# Patient Record
Sex: MALE | Race: BLACK OR AFRICAN AMERICAN | NOT HISPANIC OR LATINO | URBAN - METROPOLITAN AREA
[De-identification: names, ages, dates, MRNs, and addresses within clinical notes are randomized per-mention and may not be internally consistent; named-entity substitution may affect disease eponyms.]

---

## 2024-01-30 ENCOUNTER — OFFICE VISIT (OUTPATIENT)
Dept: URGENT CARE | Facility: CLINIC | Age: 24
End: 2024-01-30
Payer: COMMERCIAL

## 2024-01-30 VITALS
TEMPERATURE: 97.5 F | WEIGHT: 160 LBS | OXYGEN SATURATION: 96 % | SYSTOLIC BLOOD PRESSURE: 146 MMHG | HEART RATE: 65 BPM | DIASTOLIC BLOOD PRESSURE: 81 MMHG

## 2024-01-30 DIAGNOSIS — N34.2 URETHRITIS: Primary | ICD-10-CM

## 2024-01-30 PROCEDURE — 99204 OFFICE O/P NEW MOD 45 MIN: CPT | Performed by: PHYSICIAN ASSISTANT

## 2024-01-30 PROCEDURE — 87661 TRICHOMONAS VAGINALIS AMPLIF: CPT

## 2024-01-30 PROCEDURE — 87800 DETECT AGNT MULT DNA DIREC: CPT

## 2024-01-30 RX ORDER — DOXYCYCLINE 100 MG/1
100 CAPSULE ORAL 2 TIMES DAILY
Qty: 14 CAPSULE | Refills: 0 | Status: SHIPPED | OUTPATIENT
Start: 2024-01-30 | End: 2024-02-06

## 2024-01-30 ASSESSMENT — ENCOUNTER SYMPTOMS
PSYCHIATRIC NEGATIVE: 1
DYSURIA: 1
EYES NEGATIVE: 1
CONSTITUTIONAL NEGATIVE: 1
RESPIRATORY NEGATIVE: 1
NEUROLOGICAL NEGATIVE: 1
ABDOMINAL PAIN: 0
BACK PAIN: 0
HEMATOLOGIC/LYMPHATIC NEGATIVE: 1
CARDIOVASCULAR NEGATIVE: 1
ALLERGIC/IMMUNOLOGIC NEGATIVE: 1
ENDOCRINE NEGATIVE: 1

## 2024-01-30 ASSESSMENT — PAIN SCALES - GENERAL: PAINLEVEL: 6

## 2024-01-31 DIAGNOSIS — A54.9 GONORRHEA: Primary | ICD-10-CM

## 2024-01-31 LAB
C TRACH RRNA SPEC QL NAA+PROBE: NEGATIVE
N GONORRHOEA DNA SPEC QL PROBE+SIG AMP: POSITIVE
T VAGINALIS RRNA SPEC QL NAA+PROBE: NEGATIVE

## 2024-01-31 PROCEDURE — 96372 THER/PROPH/DIAG INJ SC/IM: CPT | Performed by: PHYSICIAN ASSISTANT

## 2024-01-31 RX ORDER — CEFTRIAXONE 500 MG/1
500 INJECTION, POWDER, FOR SOLUTION INTRAMUSCULAR; INTRAVENOUS ONCE
Status: COMPLETED | OUTPATIENT
Start: 2024-01-31 | End: 2024-01-31

## 2024-01-31 RX ADMIN — CEFTRIAXONE 500 MG: 500 INJECTION, POWDER, FOR SOLUTION INTRAMUSCULAR; INTRAVENOUS at 14:00

## 2024-01-31 NOTE — PROGRESS NOTES
Subjective   Patient ID: Russell Arechiga is a 23 y.o. male.      History provided by:  Patient   used: No    Difficulty Urinating  Presenting symptoms: dysuria and penile discharge    Associated symptoms: no abdominal pain      This is a 23 yr old male here for STD testing. States dysuria and yellow uretheral discharge x 1 day. Denies abdominal pain, back pain, genital rash or lesion.     Review of Systems   Constitutional: Negative.    HENT: Negative.     Eyes: Negative.    Respiratory: Negative.     Cardiovascular: Negative.    Gastrointestinal:  Negative for abdominal pain.   Endocrine: Negative.    Genitourinary:  Positive for dysuria and penile discharge. Negative for genital sores.   Musculoskeletal:  Negative for back pain.   Allergic/Immunologic: Negative.    Neurological: Negative.    Hematological: Negative.    Psychiatric/Behavioral: Negative.     All other systems reviewed and are negative.  /81   Pulse 65   Temp 36.4 °C (97.5 °F)   Wt 72.6 kg (160 lb)   SpO2 96%     Objective   Physical Exam  Vitals and nursing note reviewed.   Constitutional:       Appearance: Normal appearance.   HENT:      Head: Normocephalic and atraumatic.   Cardiovascular:      Rate and Rhythm: Normal rate and regular rhythm.   Pulmonary:      Effort: Pulmonary effort is normal.      Breath sounds: Normal breath sounds.   Abdominal:      Palpations: Abdomen is soft.      Tenderness: There is no abdominal tenderness. There is no right CVA tenderness or left CVA tenderness.   Genitourinary:     Comments: Exam deferred  Skin:     General: Skin is warm and dry.   Neurological:      General: No focal deficit present.      Mental Status: He is alert and oriented to person, place, and time.   Psychiatric:         Mood and Affect: Mood normal.         Behavior: Behavior normal.     Assessment:  Urethritis    Plan:  Urine for GC, chlamydia and trich sent and pending  Doxycycline 100 mg bid x 1 week  Pt refused  rocephin injection. I advised him if gonorrhea test is positive then he will have to come back  Follow up with pcp if not improving or worsening  ER visit anytime 24/7 for acute worsening or changing condition

## 2024-01-31 NOTE — PATIENT INSTRUCTIONS
We will call if any other culture results are positive requiring more medication  Use condoms when sexually active  Pcp follow up this week if not improving or worsening  ER visit anytime 24/7 for acute worsening or changing condition

## 2024-02-20 ENCOUNTER — OFFICE VISIT (OUTPATIENT)
Dept: URGENT CARE | Facility: CLINIC | Age: 24
End: 2024-02-20
Payer: COMMERCIAL

## 2024-02-20 VITALS
HEIGHT: 75 IN | SYSTOLIC BLOOD PRESSURE: 122 MMHG | OXYGEN SATURATION: 98 % | WEIGHT: 165 LBS | BODY MASS INDEX: 20.51 KG/M2 | DIASTOLIC BLOOD PRESSURE: 78 MMHG | HEART RATE: 63 BPM | TEMPERATURE: 98 F | RESPIRATION RATE: 16 BRPM

## 2024-02-20 DIAGNOSIS — N34.2 URETHRITIS: Primary | ICD-10-CM

## 2024-02-20 LAB
POC APPEARANCE, URINE: ABNORMAL
POC BILIRUBIN, URINE: NEGATIVE
POC BLOOD, URINE: NEGATIVE
POC COLOR, URINE: YELLOW
POC GLUCOSE, URINE: NEGATIVE MG/DL
POC KETONES, URINE: NEGATIVE MG/DL
POC LEUKOCYTES, URINE: ABNORMAL
POC NITRITE,URINE: NEGATIVE
POC PH, URINE: 6.5 PH
POC PROTEIN, URINE: NEGATIVE MG/DL
POC SPECIFIC GRAVITY, URINE: >=1.03
POC UROBILINOGEN, URINE: 0.2 EU/DL

## 2024-02-20 PROCEDURE — 99214 OFFICE O/P EST MOD 30 MIN: CPT | Performed by: PHYSICIAN ASSISTANT

## 2024-02-20 PROCEDURE — 81002 URINALYSIS NONAUTO W/O SCOPE: CPT | Performed by: PHYSICIAN ASSISTANT

## 2024-02-20 PROCEDURE — 87800 DETECT AGNT MULT DNA DIREC: CPT

## 2024-02-20 ASSESSMENT — ENCOUNTER SYMPTOMS
ALLERGIC/IMMUNOLOGIC NEGATIVE: 1
ENDOCRINE NEGATIVE: 1
EYES NEGATIVE: 1
PSYCHIATRIC NEGATIVE: 1
CARDIOVASCULAR NEGATIVE: 1
NEUROLOGICAL NEGATIVE: 1
CONSTITUTIONAL NEGATIVE: 1
ABDOMINAL PAIN: 0
RESPIRATORY NEGATIVE: 1
BACK PAIN: 0
HEMATOLOGIC/LYMPHATIC NEGATIVE: 1
DYSURIA: 0

## 2024-02-20 ASSESSMENT — PAIN SCALES - GENERAL: PAINLEVEL: 0-NO PAIN

## 2024-02-20 NOTE — PATIENT INSTRUCTIONS
We will call if you need any treatment  Pcp follow up if not improving or worsening  ER visit anytime 24/7 for acute worsening or changing condition

## 2024-02-20 NOTE — PROGRESS NOTES
"Subjective   Patient ID: Russell Arechiga is a 23 y.o. male.      History provided by:  Patient   used: No    Penile Discharge  Associated symptoms: no abdominal pain    This is a 23 yr old male here for yellow penile discharge x 1 days. Denies genital rash or lesions, dysuria, back pain, abdominal pain, or testicle pain or swelling. Had gonorrhea a few weeks ago and received IM rocephin.     Review of Systems   Constitutional: Negative.    HENT: Negative.     Eyes: Negative.    Respiratory: Negative.     Cardiovascular: Negative.    Gastrointestinal:  Negative for abdominal pain.   Endocrine: Negative.    Genitourinary:  Positive for penile discharge. Negative for dysuria, genital sores, scrotal swelling and testicular pain.   Musculoskeletal:  Negative for back pain.   Skin: Negative.    Allergic/Immunologic: Negative.    Neurological: Negative.    Hematological: Negative.    Psychiatric/Behavioral: Negative.     All other systems reviewed and are negative.  /78   Pulse 63   Temp 36.7 °C (98 °F)   Resp 16   Ht 1.905 m (6' 3\")   Wt 74.8 kg (165 lb)   SpO2 98%   BMI 20.62 kg/m²     Objective   Physical Exam  Vitals and nursing note reviewed.   Constitutional:       Appearance: Normal appearance.   HENT:      Head: Normocephalic and atraumatic.   Cardiovascular:      Rate and Rhythm: Normal rate and regular rhythm.   Pulmonary:      Effort: Pulmonary effort is normal.      Breath sounds: Normal breath sounds.   Abdominal:      Palpations: Abdomen is soft.      Tenderness: There is no abdominal tenderness. There is no right CVA tenderness or left CVA tenderness.   Skin:     General: Skin is warm and dry.   Neurological:      General: No focal deficit present.      Mental Status: He is alert and oriented to person, place, and time.   Psychiatric:         Mood and Affect: Mood normal.         Behavior: Behavior normal.     UA dip-trace leuks, no blood or " nitrites    Assessment:  Urethritis    Plan;  Urine for GC and chlamydia sent and pending  Pt declined tx till results available  Pcp follow up this week if not improving or worsening  ER visit anytime 24/7 for acute worsening or changing condition

## 2024-02-21 LAB
C TRACH RRNA SPEC QL NAA+PROBE: NEGATIVE
N GONORRHOEA DNA SPEC QL PROBE+SIG AMP: NEGATIVE

## 2024-03-27 ENCOUNTER — OFFICE VISIT (OUTPATIENT)
Dept: URGENT CARE | Facility: CLINIC | Age: 24
End: 2024-03-27
Payer: COMMERCIAL

## 2024-03-27 VITALS
TEMPERATURE: 98.2 F | WEIGHT: 150 LBS | SYSTOLIC BLOOD PRESSURE: 118 MMHG | HEIGHT: 72 IN | BODY MASS INDEX: 20.32 KG/M2 | RESPIRATION RATE: 14 BRPM | DIASTOLIC BLOOD PRESSURE: 68 MMHG | HEART RATE: 54 BPM | OXYGEN SATURATION: 97 %

## 2024-03-27 DIAGNOSIS — N34.2 URETHRITIS: Primary | ICD-10-CM

## 2024-03-27 PROCEDURE — 87800 DETECT AGNT MULT DNA DIREC: CPT

## 2024-03-27 PROCEDURE — 1036F TOBACCO NON-USER: CPT | Performed by: PHYSICIAN ASSISTANT

## 2024-03-27 PROCEDURE — 87661 TRICHOMONAS VAGINALIS AMPLIF: CPT

## 2024-03-27 PROCEDURE — 99214 OFFICE O/P EST MOD 30 MIN: CPT | Performed by: PHYSICIAN ASSISTANT

## 2024-03-27 ASSESSMENT — PAIN SCALES - GENERAL: PAINLEVEL: 0-NO PAIN

## 2024-03-27 NOTE — LETTER
March 27, 2024     Patient: Russell Arechiga   YOB: 2000   Date of Visit: 3/27/2024       To Whom It May Concern:    It is my medical opinion that Russell Arechiga may return to work on 03/28/2024 .    If you have any questions or concerns, please don't hesitate to call.         Sincerely,        Barron Easley PA-C    CC: No Recipients

## 2024-03-27 NOTE — PROGRESS NOTES
Subjective   Patient ID: Russell Arechiga is a 24 y.o. male who presents for Penile Discharge (X2 days. No dysuria. Concern for STI).  Patient notes he has not been sexually active since January he did test positive for gonorrhea in January he was treated appropriately with Rocephin and had repeat testing February 20 that was negative at that time.  Patient denies any sexual activity since that time but the urethral discharge has returned.  He notes yellow discharge.  He denies any dysuria.  Denies any tenderness into the scrotum or the testicles.  Denies any fevers or chills rashes or lesions of the inguinal region      The remainder of the systems were reviewed and are negative unless noted above      Objective   /68   Pulse 54   Temp 36.8 °C (98.2 °F) (Temporal)   Resp 14   Ht 1.829 m (6')   Wt 68 kg (150 lb)   SpO2 97%   BMI 20.34 kg/m²   Physical Exam  Constitutional:       General: He is not in acute distress.     Appearance: Normal appearance. He is not ill-appearing, toxic-appearing or diaphoretic.   HENT:      Head: Normocephalic and atraumatic.      Mouth/Throat:      Mouth: Mucous membranes are moist.      Pharynx: Oropharynx is clear.   Eyes:      Conjunctiva/sclera: Conjunctivae normal.   Cardiovascular:      Rate and Rhythm: Normal rate and regular rhythm.      Heart sounds: No murmur heard.  Pulmonary:      Effort: Pulmonary effort is normal. No respiratory distress.      Breath sounds: Normal breath sounds. No wheezing.   Genitourinary:     Comments: Patient declines urogenital examination today  Musculoskeletal:         General: No swelling, tenderness, deformity or signs of injury. Normal range of motion.      Cervical back: Normal range of motion and neck supple.   Skin:     General: Skin is warm and dry.      Findings: No rash.   Neurological:      General: No focal deficit present.      Mental Status: He is alert and oriented to person, place, and time.      Gait: Gait normal.          Assessment/Plan   Problem List Items Addressed This Visit       Urethritis - Primary    Relevant Orders    C. trachomatis + N. gonorrhoeae, Amplified    Trichomonas vaginalis, Nucleic Acid Detection    Mycoplasma / Ureaplasma Culture    Syphilis Screen with Reflex    HIV 1/2 Antigen/Antibody Screen with Reflex to Confirmation   Patient is here with symptoms of yellow urethral discharge.  At the end of January patient had tested positive for gonorrhea.  Notes that he has not had sex with anybody since that time.  He notes that symptoms did seem to improve but he was retested again February 20 all test were negative at that time.  Symptoms persistent since.  Patient denies any pain into the scrotum or the testicles denies any rashes in the inguinal region.  He denies any fevers or chills or abdominal pain    Given the persistent discharge I am sending testing for gonorrhea chlamydia and trichomonas as well as mycoplasma Ureaplasma culture.  I have placed orders for the patient to have blood drawn at the lab for syphilis and HIV.  Discussed with patient we do not draw blood labs here at this clinic

## 2024-03-27 NOTE — PATIENT INSTRUCTIONS
Assessment/Plan   Problem List Items Addressed This Visit       Urethritis - Primary    Relevant Orders    C. trachomatis + N. gonorrhoeae, Amplified    Trichomonas vaginalis, Nucleic Acid Detection    Mycoplasma / Ureaplasma Culture    Syphilis Screen with Reflex    HIV 1/2 Antigen/Antibody Screen with Reflex to Confirmation   Patient is here with symptoms of yellow urethral discharge.  At the end of January patient had tested positive for gonorrhea.  Notes that he has not had sex with anybody since that time.  He notes that symptoms did seem to improve but he was retested again February 20 all test were negative at that time.  Symptoms persistent since.  Patient denies any pain into the scrotum or the testicles denies any rashes in the inguinal region.  He denies any fevers or chills or abdominal pain    Given the persistent discharge I am sending testing for gonorrhea chlamydia and trichomonas as well as mycoplasma Ureaplasma culture.  I have placed orders for the patient to have blood drawn at the lab for syphilis and HIV.  Discussed with patient we do not draw blood labs here at this clinic

## 2024-03-28 LAB
C TRACH RRNA SPEC QL NAA+PROBE: NEGATIVE
N GONORRHOEA DNA SPEC QL PROBE+SIG AMP: NEGATIVE
T VAGINALIS RRNA SPEC QL NAA+PROBE: NEGATIVE

## 2024-03-29 ENCOUNTER — LAB (OUTPATIENT)
Dept: LAB | Facility: LAB | Age: 24
End: 2024-03-29
Payer: COMMERCIAL

## 2024-03-29 LAB
HIV 1+2 AB+HIV1 P24 AG SERPL QL IA: NONREACTIVE
TREPONEMA PALLIDUM IGG+IGM AB [PRESENCE] IN SERUM OR PLASMA BY IMMUNOASSAY: NONREACTIVE

## 2024-03-29 PROCEDURE — 36415 COLL VENOUS BLD VENIPUNCTURE: CPT

## 2024-03-29 PROCEDURE — 86780 TREPONEMA PALLIDUM: CPT

## 2024-03-29 PROCEDURE — 87389 HIV-1 AG W/HIV-1&-2 AB AG IA: CPT

## 2024-04-05 ENCOUNTER — OFFICE VISIT (OUTPATIENT)
Dept: URGENT CARE | Facility: CLINIC | Age: 24
End: 2024-04-05
Payer: COMMERCIAL

## 2024-04-05 VITALS
HEART RATE: 57 BPM | WEIGHT: 155 LBS | SYSTOLIC BLOOD PRESSURE: 126 MMHG | BODY MASS INDEX: 19.27 KG/M2 | DIASTOLIC BLOOD PRESSURE: 71 MMHG | TEMPERATURE: 97.7 F | RESPIRATION RATE: 16 BRPM | OXYGEN SATURATION: 98 % | HEIGHT: 75 IN

## 2024-04-05 DIAGNOSIS — R36.9 URETHRAL DISCHARGE IN MALE: Primary | ICD-10-CM

## 2024-04-05 LAB
POC APPEARANCE, URINE: ABNORMAL
POC BILIRUBIN, URINE: NEGATIVE
POC BLOOD, URINE: NEGATIVE
POC COLOR, URINE: YELLOW
POC GLUCOSE, URINE: NEGATIVE MG/DL
POC KETONES, URINE: NEGATIVE MG/DL
POC LEUKOCYTES, URINE: NEGATIVE
POC NITRITE,URINE: NEGATIVE
POC PH, URINE: 7 PH
POC PROTEIN, URINE: NEGATIVE MG/DL
POC SPECIFIC GRAVITY, URINE: 1.02
POC UROBILINOGEN, URINE: 0.2 EU/DL

## 2024-04-05 PROCEDURE — 81002 URINALYSIS NONAUTO W/O SCOPE: CPT | Performed by: PHYSICIAN ASSISTANT

## 2024-04-05 PROCEDURE — 99213 OFFICE O/P EST LOW 20 MIN: CPT | Performed by: PHYSICIAN ASSISTANT

## 2024-04-05 RX ORDER — DOXYCYCLINE 100 MG/1
100 CAPSULE ORAL 2 TIMES DAILY
Qty: 20 CAPSULE | Refills: 0 | Status: SHIPPED | OUTPATIENT
Start: 2024-04-05 | End: 2024-04-17 | Stop reason: ALTCHOICE

## 2024-04-05 ASSESSMENT — PAIN SCALES - GENERAL: PAINLEVEL: 0-NO PAIN

## 2024-04-05 ASSESSMENT — ENCOUNTER SYMPTOMS: DYSURIA: 0

## 2024-04-05 NOTE — PROGRESS NOTES
Subjective   Patient ID: Russell Arechiga is a 24 y.o. male.    Patient is a 24-year-old male who complains of ongoing urethral discharge that he states he has been experiencing for the past 2 months.  Patient was seen and evaluated this urgent care facility in January for same at which time he was given Rocephin 250 mg IM and provided with a prescription for doxycycline.  Patient states that he did not know that he was prescribed doxycycline and did not take that medication.  Patient was again seen at this urgent care facility on 27 March 2024 at which time repeat STD testing was performed in addition to HIV and syphilis screens.  All results are in the [facility] medical records and are all noted to be negative.  Patient denies fever, chills or other illness symptoms.  Patient reports no rash or lesions to the skin of his genitalia.  Patient has been in the [place] area for the past 4 months but states he is from [place] and has no local primary care physician here.      The following portions of the chart were reviewed this encounter and updated as appropriate:       Review of Systems   Genitourinary:  Positive for penile discharge. Negative for dysuria, scrotal swelling and testicular pain.   Skin:  Negative for rash.   All other systems reviewed and are negative.  Objective   Physical Exam  Vitals and nursing note reviewed.   Constitutional:       Appearance: Normal appearance. He is normal weight.   Cardiovascular:      Pulses: Normal pulses.   Pulmonary:      Effort: Pulmonary effort is normal.      Breath sounds: Normal breath sounds.   Skin:     General: Skin is warm and dry.      Capillary Refill: Capillary refill takes less than 2 seconds.   Neurological:      General: No focal deficit present.      Mental Status: He is alert and oriented to person, place, and time.   Psychiatric:         Mood and Affect: Mood normal.         Behavior: Behavior normal.         Thought Content: Thought content normal.          Judgment: Judgment normal.     Assessment/Plan   Limited physical exam findings as noted above.  Patient strenuously objected to physical exam of his genitalia despite the explanation that a culture of the urethra should be obtained given his recent negative laboratory test results.  Patient was provided with a prescription for doxycycline 100 mg for presumed urethritis and instructed to make certain that he obtains that medication and take as directed.  Patient was advised that no additional testing or evaluation at all is possible at this urgent care facility and he needs to be evaluated by a primary care physician or urologist.  Patient also refuses referral to the above.  Patient states that he anticipates returning to [place] next week and he was instructed to obtain follow-up medical care in [place].    CLINICAL IMPRESSION:  Reported Urethral Discharge      Diagnoses and all orders for this visit:  Urethral discharge in male  -     POCT UA (nonautomated w/o microscopy) manually resulted  -     C. Trachomatis / N. Gonorrhoeae, Amplified Detection  -     Trichomonas vaginalis, Nucleic Acid Detection  -     doxycycline (Monodox) 100 mg capsule; Take 1 capsule (100 mg) by mouth 2 times a day for 10 days. Take with at least 8 ounces (large glass) of water, do not lie down for 30 minutes after    Patient disposition: Home

## 2024-04-05 NOTE — LETTER
April 5, 2024     Patient: Russell Arechiga   YOB: 2000   Date of Visit: 4/5/2024       To Whom It May Concern:    It is my medical opinion that Russell Arechiga may return to work on 06 April 2024 .    If you have any questions or concerns, please don't hesitate to call.         Sincerely,        Mauricio Mcknight PA-C    CC: No Recipients

## 2024-04-17 ENCOUNTER — OFFICE VISIT (OUTPATIENT)
Dept: URGENT CARE | Facility: CLINIC | Age: 24
End: 2024-04-17
Payer: COMMERCIAL

## 2024-04-17 VITALS
HEIGHT: 75 IN | RESPIRATION RATE: 14 BRPM | DIASTOLIC BLOOD PRESSURE: 79 MMHG | BODY MASS INDEX: 19.27 KG/M2 | WEIGHT: 155 LBS | SYSTOLIC BLOOD PRESSURE: 126 MMHG | HEART RATE: 64 BPM | TEMPERATURE: 98.3 F | OXYGEN SATURATION: 98 %

## 2024-04-17 DIAGNOSIS — L73.1 INGROWN HAIR: Primary | ICD-10-CM

## 2024-04-17 PROBLEM — L73.9 FOLLICULITIS: Status: ACTIVE | Noted: 2024-04-17

## 2024-04-17 PROCEDURE — 99213 OFFICE O/P EST LOW 20 MIN: CPT

## 2024-04-17 PROCEDURE — 1036F TOBACCO NON-USER: CPT

## 2024-04-17 ASSESSMENT — ENCOUNTER SYMPTOMS
FEVER: 0
CHILLS: 0
VOMITING: 0
DIARRHEA: 0
NAUSEA: 0

## 2024-04-17 ASSESSMENT — PAIN SCALES - GENERAL: PAINLEVEL: 0-NO PAIN

## 2024-04-17 NOTE — PROGRESS NOTES
Subjective   Patient ID: Russell Arechiga is a 24 y.o. male.    HPI  Patient presents to urgent care for complaints of a lesion on his penis for the last week.  Patient denies any pain, itching, discharge from the area.  Patient states that he is in a sexual relationship with his girlfriend, however she has no history of cold sores, or vaginal lesions.  Patient states that he has no history of the herpes virus.  Patient states that he was seen here at the beginning of the month for possible STI however those symptoms have resolved and once he stopped taking the antibiotic he noticed this.  Patient denies any fevers, chills.      Review of Systems   Constitutional:  Negative for chills and fever.   Gastrointestinal:  Negative for diarrhea, nausea and vomiting.   Genitourinary:  Negative for penile discharge, penile pain, penile swelling, scrotal swelling and testicular pain.   Skin:  Positive for rash.     Objective   Physical Exam  Constitutional:       Appearance: Normal appearance.   Eyes:      Extraocular Movements: Extraocular movements intact.      Conjunctiva/sclera: Conjunctivae normal.      Pupils: Pupils are equal, round, and reactive to light.   Cardiovascular:      Rate and Rhythm: Normal rate and regular rhythm.      Pulses: Normal pulses.      Heart sounds: Normal heart sounds.   Pulmonary:      Effort: Pulmonary effort is normal.      Breath sounds: Normal breath sounds.   Genitourinary:     Penis: Normal.    Skin:     General: Skin is warm and dry.      Capillary Refill: Capillary refill takes less than 2 seconds.      Comments: Ingrown hair noted on shaft of penis   Neurological:      General: No focal deficit present.      Mental Status: He is alert and oriented to person, place, and time.         Discussed with patient that this is an ingrown hair and will resolve on its own.  I do not see any concern for possible HSV so no further testing was done at this time.  Follow-up with primary care provider  if no improvement of symptoms.    Assessment/Plan   Problem List Items Addressed This Visit             ICD-10-CM    Ingrown hair - Primary L73.1       Patient disposition: Home

## 2024-04-17 NOTE — LETTER
April 17, 2024     Patient: Russell Arechiga   YOB: 2000   Date of Visit: 4/17/2024       To Whom It May Concern:    It is my medical opinion that Russell Arechiga may return to work on 4/18/24 .    If you have any questions or concerns, please don't hesitate to call.         Sincerely,        PAM Fair-CNP    CC: No Recipients

## 2024-04-22 ENCOUNTER — OFFICE VISIT (OUTPATIENT)
Dept: URGENT CARE | Facility: CLINIC | Age: 24
End: 2024-04-22
Payer: COMMERCIAL

## 2024-04-22 VITALS
RESPIRATION RATE: 14 BRPM | WEIGHT: 155 LBS | OXYGEN SATURATION: 98 % | DIASTOLIC BLOOD PRESSURE: 71 MMHG | SYSTOLIC BLOOD PRESSURE: 114 MMHG | BODY MASS INDEX: 19.27 KG/M2 | TEMPERATURE: 98.2 F | HEART RATE: 68 BPM | HEIGHT: 75 IN

## 2024-04-22 DIAGNOSIS — K52.9 GASTROENTERITIS: Primary | ICD-10-CM

## 2024-04-22 PROCEDURE — 99213 OFFICE O/P EST LOW 20 MIN: CPT | Performed by: PHYSICIAN ASSISTANT

## 2024-04-22 PROCEDURE — 1036F TOBACCO NON-USER: CPT | Performed by: PHYSICIAN ASSISTANT

## 2024-04-22 ASSESSMENT — PAIN SCALES - GENERAL: PAINLEVEL: 0-NO PAIN

## 2024-04-22 NOTE — PATIENT INSTRUCTIONS
Assessment/Plan   Problem List Items Addressed This Visit       Gastroenteritis - Primary      Patient history and symptoms consistent with likely viral or self-limiting gastroenteritis versus foodborne illness  Symptoms improved patient is here for work note next scheduled date of work is Wednesday patient notes that he is feeling well enough to return to work

## 2024-04-22 NOTE — PROGRESS NOTES
"Subjective   Patient ID: Russell Arechiga is a 24 y.o. male who presents for Diarrhea (Multiple episodes of diarrhea x2 days after \"eating chinese food\". No N/V.) and Headache (Currently resolved.).  Patient notes that he had several episodes of nonbloody diarrhea.  Notes symptoms now resolved and stools are back to normal.  Notes headache also resolved.  No episodes of vomiting.  No fevers.  Patient notes that he had to call off work and is here for a work note      The remainder of the systems were reviewed and are negative unless noted above      Objective   /71   Pulse 68   Temp 36.8 °C (98.2 °F) (Temporal)   Resp 14   Ht 1.905 m (6' 3\")   Wt 70.3 kg (155 lb)   SpO2 98%   BMI 19.37 kg/m²   Physical Exam  Constitutional:       General: He is not in acute distress.     Appearance: Normal appearance. He is not ill-appearing, toxic-appearing or diaphoretic.   HENT:      Head: Normocephalic and atraumatic.      Mouth/Throat:      Mouth: Mucous membranes are moist.      Pharynx: Oropharynx is clear.   Eyes:      Conjunctiva/sclera: Conjunctivae normal.   Cardiovascular:      Rate and Rhythm: Normal rate and regular rhythm.      Heart sounds: No murmur heard.  Pulmonary:      Effort: Pulmonary effort is normal. No respiratory distress.      Breath sounds: Normal breath sounds. No wheezing.   Abdominal:      General: Abdomen is flat. Bowel sounds are normal.      Palpations: Abdomen is soft.      Tenderness: There is no abdominal tenderness. There is no guarding or rebound.   Musculoskeletal:         General: No swelling, tenderness, deformity or signs of injury. Normal range of motion.      Cervical back: Normal range of motion and neck supple.   Skin:     General: Skin is warm and dry.      Findings: No erythema or rash.   Neurological:      General: No focal deficit present.      Mental Status: He is alert and oriented to person, place, and time.      Gait: Gait normal.         Assessment/Plan   Problem " List Items Addressed This Visit       Gastroenteritis - Primary      Patient history and symptoms consistent with likely viral or self-limiting gastroenteritis versus foodborne illness.  Symptoms now completely resolved; patient is here for work note next scheduled date of work is Wednesday patient notes that he is feeling well enough to return to work

## 2024-04-22 NOTE — LETTER
April 22, 2024     Patient: Russell Arechiga   YOB: 2000   Date of Visit: 4/22/2024       To Whom It May Concern:    It is my medical opinion that Rusesll Arechiga may return to work on 04/24/2024 .    If you have any questions or concerns, please don't hesitate to call.         Sincerely,        Barron Easley PA-C    CC: No Recipients

## 2024-05-09 ENCOUNTER — OFFICE VISIT (OUTPATIENT)
Dept: URGENT CARE | Facility: CLINIC | Age: 24
End: 2024-05-09
Payer: COMMERCIAL

## 2024-05-09 VITALS
DIASTOLIC BLOOD PRESSURE: 78 MMHG | SYSTOLIC BLOOD PRESSURE: 122 MMHG | OXYGEN SATURATION: 96 % | BODY MASS INDEX: 19.37 KG/M2 | RESPIRATION RATE: 18 BRPM | HEART RATE: 60 BPM | TEMPERATURE: 98.2 F | WEIGHT: 155 LBS

## 2024-05-09 DIAGNOSIS — K52.9 GASTROENTERITIS: Primary | ICD-10-CM

## 2024-05-09 PROCEDURE — 99213 OFFICE O/P EST LOW 20 MIN: CPT

## 2024-05-09 ASSESSMENT — ENCOUNTER SYMPTOMS
DIARRHEA: 1
FATIGUE: 0
COUGH: 0
FEVER: 0
WEAKNESS: 0
RHINORRHEA: 0
NAUSEA: 1
ABDOMINAL PAIN: 0
CHILLS: 0
HEADACHES: 0
CHEST TIGHTNESS: 0
SORE THROAT: 0
MYALGIAS: 0
SHORTNESS OF BREATH: 0
BLOOD IN STOOL: 0
VOMITING: 1

## 2024-05-09 ASSESSMENT — PAIN SCALES - GENERAL: PAINLEVEL: 0-NO PAIN

## 2024-05-09 NOTE — PROGRESS NOTES
Subjective   Patient ID: Russell Arechiga is a 24 y.o. male.    HPI    Patient presents to urgent care for work note. He states that Monday he ate [employer] and then Monday night into Tuesday he developed both vomiting and diarrhea. Patient states that he is feeling completely back to normal. Patient denies any nausea, vomiting, or diarrhea now. Patient denies any known fevers, chills, or abdominal pain. Patient denies any bloody or bilious vomiting or diarrhea.     Review of Systems   Constitutional:  Negative for chills, fatigue and fever.   HENT:  Negative for congestion, ear pain, rhinorrhea and sore throat.    Respiratory:  Negative for cough, chest tightness and shortness of breath.    Gastrointestinal:  Positive for diarrhea, nausea and vomiting. Negative for abdominal pain and blood in stool.   Musculoskeletal:  Negative for myalgias.   Neurological:  Negative for weakness and headaches.     Objective   Physical Exam  Constitutional:       Appearance: Normal appearance. He is normal weight.   HENT:      Head: Normocephalic and atraumatic.      Right Ear: Tympanic membrane, ear canal and external ear normal.      Left Ear: Tympanic membrane, ear canal and external ear normal.      Nose: Nose normal.      Mouth/Throat:      Mouth: Mucous membranes are moist.      Pharynx: Oropharynx is clear.   Eyes:      Extraocular Movements: Extraocular movements intact.      Conjunctiva/sclera: Conjunctivae normal.      Pupils: Pupils are equal, round, and reactive to light.   Cardiovascular:      Rate and Rhythm: Normal rate and regular rhythm.      Pulses: Normal pulses.      Heart sounds: Normal heart sounds.   Pulmonary:      Effort: Pulmonary effort is normal.      Breath sounds: Normal breath sounds.   Abdominal:      General: Abdomen is flat. Bowel sounds are normal.      Palpations: Abdomen is soft.   Musculoskeletal:         General: Normal range of motion.   Skin:     General: Skin is warm and dry.      Capillary  Refill: Capillary refill takes less than 2 seconds.   Neurological:      General: No focal deficit present.      Mental Status: He is alert and oriented to person, place, and time.       Work note provided for patient to return to work on 5/10/24 or next scheduled shift.     Assessment/Plan   Problem List Items Addressed This Visit             ICD-10-CM    Gastroenteritis - Primary K52.9       Patient disposition: Home

## 2024-05-09 NOTE — LETTER
May 9, 2024     Patient: Russell Arechiga   YOB: 2000   Date of Visit: 5/9/2024       To Whom It May Concern:    It is my medical opinion that Russell Arechiga may return to work on 5/10/24 or next scheduled shift .    If you have any questions or concerns, please don't hesitate to call.         Sincerely,        Sheeba Darnell, PAM-CNP    CC: No Recipients

## 2024-05-17 ENCOUNTER — OFFICE VISIT (OUTPATIENT)
Dept: URGENT CARE | Facility: CLINIC | Age: 24
End: 2024-05-17
Payer: COMMERCIAL

## 2024-05-17 VITALS
TEMPERATURE: 98 F | OXYGEN SATURATION: 96 % | WEIGHT: 150 LBS | DIASTOLIC BLOOD PRESSURE: 62 MMHG | RESPIRATION RATE: 16 BRPM | HEIGHT: 75 IN | SYSTOLIC BLOOD PRESSURE: 110 MMHG | BODY MASS INDEX: 18.65 KG/M2 | HEART RATE: 56 BPM

## 2024-05-17 DIAGNOSIS — L73.1 INGROWN HAIR: Primary | ICD-10-CM

## 2024-05-17 PROCEDURE — 99213 OFFICE O/P EST LOW 20 MIN: CPT | Performed by: PHYSICIAN ASSISTANT

## 2024-05-17 ASSESSMENT — PAIN SCALES - GENERAL: PAINLEVEL: 8

## 2024-05-17 NOTE — LETTER
May 17, 2024     Patient: Russell Arechiga   YOB: 2000   Date of Visit: 5/17/2024       To Whom It May Concern:    It is my medical opinion that Russell Arechiga may return to work on 05/18/2024 .    If you have any questions or concerns, please don't hesitate to call.         Sincerely,        Barron Easley PA-C    CC: No Recipients

## 2024-05-17 NOTE — PATIENT INSTRUCTIONS
Assessment/Plan   Problem List Items Addressed This Visit       Ingrown hair - Primary      Examination without any significant erythema or edema.  I do not suspect localized infection.  Recommending patient may return to work without restrictions

## 2024-05-17 NOTE — PROGRESS NOTES
"Subjective   Patient ID: Russell Arechiga is a 24 y.o. male who presents for Groin Pain (Ingrown hair / drainage today painful /1month with lump).  Patient denies any continued pain at the area after it reportedly drained today.  He denies any fevers or chills.  Notes that he called off work because of the discomfort.  He is here today for a work note      The remainder of the systems were reviewed and are negative unless noted above      Objective   /62 (BP Location: Left arm, Patient Position: Sitting, BP Cuff Size: Adult)   Pulse 56   Temp 36.7 °C (98 °F) (Temporal)   Resp 16   Ht 1.905 m (6' 3\")   Wt 68 kg (150 lb)   SpO2 96%   BMI 18.75 kg/m²   Physical Exam  Constitutional:       General: He is not in acute distress.     Appearance: Normal appearance. He is not ill-appearing, toxic-appearing or diaphoretic.   HENT:      Head: Normocephalic and atraumatic.      Mouth/Throat:      Mouth: Mucous membranes are moist.      Pharynx: Oropharynx is clear.   Eyes:      Conjunctiva/sclera: Conjunctivae normal.   Cardiovascular:      Rate and Rhythm: Normal rate and regular rhythm.      Heart sounds: No murmur heard.  Pulmonary:      Effort: Pulmonary effort is normal. No respiratory distress.      Breath sounds: Normal breath sounds. No wheezing.   Genitourinary:     Penis: Normal.       Comments:   Examination of the penis shows no evidence of erythema or edema surrounding the hair follicle in question.  In fact I do not see any evidence of ingrown hair at place of the patient is pointing to in particular at all  Musculoskeletal:         General: No swelling, tenderness, deformity or signs of injury. Normal range of motion.      Cervical back: Normal range of motion and neck supple.   Skin:     General: Skin is warm and dry.      Findings: No erythema or rash.   Neurological:      General: No focal deficit present.      Mental Status: He is alert.      Gait: Gait normal.         Assessment/Plan   Problem " List Items Addressed This Visit       Ingrown hair - Primary      Examination without any significant erythema or edema.  I do not suspect localized infection.  Recommending patient may return to work without restrictions